# Patient Record
Sex: MALE | Race: OTHER | URBAN - METROPOLITAN AREA
[De-identification: names, ages, dates, MRNs, and addresses within clinical notes are randomized per-mention and may not be internally consistent; named-entity substitution may affect disease eponyms.]

---

## 2024-01-11 ENCOUNTER — OFFICE VISIT (OUTPATIENT)
Facility: CLINIC | Age: 69
End: 2024-01-11

## 2024-01-11 VITALS — OXYGEN SATURATION: 99 % | SYSTOLIC BLOOD PRESSURE: 128 MMHG | HEART RATE: 75 BPM | DIASTOLIC BLOOD PRESSURE: 82 MMHG

## 2024-01-11 DIAGNOSIS — R63.4 WEIGHT LOSS: ICD-10-CM

## 2024-01-11 DIAGNOSIS — E05.90 THYROTOXICOSIS WITHOUT THYROID STORM, UNSPECIFIED THYROTOXICOSIS TYPE: ICD-10-CM

## 2024-01-11 DIAGNOSIS — E11.65 TYPE 2 DIABETES MELLITUS WITH HYPERGLYCEMIA, WITHOUT LONG-TERM CURRENT USE OF INSULIN (HCC): Primary | ICD-10-CM

## 2024-01-11 PROCEDURE — 99205 OFFICE O/P NEW HI 60 MIN: CPT

## 2024-01-11 RX ORDER — ATORVASTATIN CALCIUM 40 MG/1
40 TABLET, FILM COATED ORAL DAILY
COMMUNITY
Start: 2023-07-18

## 2024-01-11 RX ORDER — CANAGLIFLOZIN 300 MG/1
300 TABLET, FILM COATED ORAL
COMMUNITY

## 2024-01-11 RX ORDER — PROPRANOLOL HYDROCHLORIDE 10 MG/1
10 TABLET ORAL 2 TIMES DAILY
COMMUNITY

## 2024-01-11 RX ORDER — ENALAPRIL MALEATE 10 MG/1
10 TABLET ORAL DAILY
COMMUNITY

## 2024-01-11 NOTE — PATIENT INSTRUCTIONS
How to apply for medicaid: [email or web address]    THYROID PLAN:  - repeat thyroid lab tests within the next week or so. Have [facility] fax to me: Fax number: [phone]  - repeat thyroid lab tests again after 2/19/24  - repeat thyroid lab tests one more time before our next visit: Around 4/1/24. I also added on an A1C(For diabetes)  to be done before that next visit  - based on the labs that I receive from you today, we will start methimazole  - continue propranolol 10mg twice daily; this will help your heart rate control related to the thyroid- goal is 60-90 AT rest . You can use a pulse oximeter to check  - side effects of methimazole include: Up to 15 percent of people who take an antithyroid drug have minor side effects. Both methimazole and PTU can cause itching, rash, hives, joint pain and swelling, fever, changes in taste, nausea, and vomiting. Please notify if you experience any of these side effects  - I am checking your thyroid antibodies for Graves disease. Review the information on Graves attached. If not Graves, we will complete a thyroid uptake and scan- I will call you with that information     Understanding thyroid labs:  - TSH = thyroid stimulating hormone, this is the 'signal' which turns UP or DOWN depending on how much hormone your thyroid gland is producing.    - free T4, total T3 = the thyroid hormones in your blood, this is the thyroid number you can \"Feel\"- if T4 is low, that indicates hypothyroidism, if T4 is high, that indicates hyperthyroidism    Diabetes:  - CHECK BLOOD SUGAR 1-2 TIMES PER WEEK. The goals are outlined below. I recommend using Relion glucometer - Test your blood sugar in the morning or before dinner 1-2x per week - pre breakfast, or pre dinner, and write down what it is - [email or web address]/[email or web address]- ReliOn Platinum Blood Glucose Monitoring System (you need a meter, strips, and a lancing device)    DIABETES  MEDICATIONS:  - call office and schedule a nutrition visit with Delphine (Office phone number: [phone])  - continue invokana 300mg daily   - Start metformin- we will start with 500mg once daily and slowly increase over four weeks to the goal of 1000mg twice daily. They are large tablets. It is best to take them with food to reduce unwanted GI side effects. We are going to start this slow and increase it, so you do not have GI side effects. The most common side effect is diarrhea, but when increased slowly usually it resolves within 2-3 days.   - Metformin dosing:   Week 1: Metformin 500m tab daily in morning with food  Week 2: Metformin 500mg  1 tabs in AM, 1 tab in PM with food  Week 3: Metformin 500m tabs in AM, 1 tab in PM   Week 4: Metformin 500m tabs in AM, 2 tabs in PM (for total dose of 1,000mg twice daily)       - Please review the following on your INVOKANA - drink plenty of water  This medication will remove extra sugar out of blood into your urine. It can be dosed anytime of day, but morning is probably best time to take it as it can cause increased urination. Please drink at least 4 glasses of water daily to avoid dehydration. It does not cause low blood sugars (hypoglycemia). If you develop any low blood sugars, we will need to adjust other medications. Please call me if you develop any symptoms of urinary tract infection (burning with urination) or yeast infection (new rash itching or burning in the genital area).     While you are taking this medication please be mindful of sick day protocol (for illness, if you are vomiting, excessively exercising/sweating, or alcohol intake) to avoid dehydration:   -Temporarily hold the medication if you are in a dehydrated state. You can call the clinic for further instruction if you are sick and can't remember which diabetes medication to hold.  -Check blood sugar levels more often while sick  -Seek medical help early if you develop any abdominal  pain, nausea or vomiting.  -Okay to resume this medication once you are eating /drinking regularly and no longer dehydrated.    Surgery protocol:  If you are having surgery please contact the clinic as we will need to adjust your diabetes medications. This medication needs to be held at least 4 days prior to your procedure.. Once eating/drinking normally after surgery, ok to resume (this avoids dehydration).            [facility] - KIM Asher  Office phone number: [phone]  Fax number: [phone]

## 2024-01-11 NOTE — PROGRESS NOTES
[facility] Note    Name: Richard Valencia    Date: 1/11/2024    HISTORY OF PRESENT ILLNESS   Richard Valencia is a 68 year old male with PMHx significant for T2DM, anxiety/depression,  who presents for  hyperthyroidism and T2DM management.    Initial HPI consult in January 2024  Here with his wife, Jose and daughter Cinda. Pt declines  services and that his daughter provides translation.    Diabetes hx:  Last A1c value was 8.7% done 12/2023  Here for management of hyperthyroidism and DM. Brought in notes from [facility] in [place]    Diagnosed w/ DM age: 2011 (age 56)  Pt denies hx of hospitalization for DM and/or pancreatitis   Family history of DM: strong on mother's side  Random  in office, nonfasting (ate coffee & 1 mexican sweet bread, 2 pc sweet potato)  DM meds at first office visit: invokana 300mg daily  Denies polydipsia/polyuria    Blood Glucose log/CGM: not checking his BG at home  Previously trialed/failed DM meds: metformin- diarrhea    Thyroid hx:  (-) Fhx of thyroid disease   Dx'd age 68 on blood work (11/2023)    11/2023- TSH <0.005, fT4 2.01, antiTPO neg  12/2023: TSH <0.005, fT4 - 2.97    Pt endorses: anxiety, recent weight loss of approx 20-25lbs. Notes legs are cold, occ diarrhea, tremor    Pt denies vision changes, recent palpitations (had some initially, but resolved w/ start of propranolol), temperature intolerance/sweating, edema      REVIEW OF SYSTEMS  Ten point review of systems has been performed and is otherwise negative and/or non-contributory, except as described above.     Medications:     Current Outpatient Medications:     atorvastatin 40 MG Oral Tab, Take 1 tablet (40 mg total) by mouth daily., Disp: , Rfl:     enalapril 10 MG Oral Tab, Take 1 tablet (10 mg total) by mouth daily., Disp: , Rfl:     propranolol 10 MG Oral Tab, Take 1 tablet (10 mg total) by mouth 2 (two) times daily., Disp: , Rfl:     Canagliflozin (INVOKANA) 300 MG Oral  Tab, Take 300 mg by mouth every morning before breakfast., Disp: , Rfl:      Allergies:   Not on File    Social History:   Social History     Socioeconomic History    Marital status:    Tobacco Use    Smoking status: Never    Smokeless tobacco: Never   Substance and Sexual Activity    Alcohol use: Not Currently     Comment: occasional    Drug use: Never       Medical History:   Reviewed    Surgical history:   History reviewed. No pertinent surgical history.    PHYSICAL EXAM  Vitals:    01/11/24 1021   BP: 128/82   Pulse: 75   SpO2: 99%       General Appearance:  alert, well developed, in no acute distress  Eyes:  normal conjunctivae, sclera  Ears/Nose/Mouth/Throat/Neck:  +diffusely enlarged goiter, mild tenderness on palpation of R sided lobe. No exophthalmus or lid lag noted  Cardiovascular:  regular rate  Respiratory:  breathing comfortably  Musculoskeletal:  normal muscle strength and tone. +tremor in hands  Skin:  normal moisture and skin texture  Hair & Nails:  normal scalp hair     Psychiatric:  oriented to time, self, and place  Neuro:  sensory grossly intact and motor grossly intact    Labs/Imaging: Reviewed.    ASSESSMENT/PLAN:    (E11.65) Type 2 diabetes mellitus with hyperglycemia, without long-term current use of insulin ([facility])  (primary encounter diagnosis)  Plan: Hemoglobin A1C [E]    Last A1c value was 8.7% done 12/2023, goal <7%. Plan to restart metformin at a low dose and trial tolerance w/ food. Meet with CDE re: carb aware diet. If BG fail to improve can trial OHARA vs TZD vs insulin; cost is a consideration as pt is self-pay. Currently getting invokana affordably thru his pharmacy.     We discussed the importance of glycemic control to prevent complications of diabetes. We discussed the importance of SBGM and consistent, low carb diet as well as moderate exercise as well.  We also discussed the complications of diabetes include retinopathy, neuropathy, nephropathy and cardiovascular disease.       - start metformin 500mg daily; incr by 500mg qWeek to 1G BID, with food  - continue invokana 300mg daily  - meet with CDE re: carb aware diet teaching  - check BG 1-2x/day using glucometer or CGM- gave relion brand info  - no strict CI for OHARA, GLP1a, or TZD; consider if not improving after metformin and diet changes  - SGLT2i instructions provided re: surgery, times of illness/dehydration       DM quality metrics:  - Ophtho: No data recorded No data recorded  - BP is controlled, on ACE-I  - LDL is at goal (was 45 on 12/2023), on statin    - MAB negative 12/2023 (results reviewed from lab testing) on SGLT2i, ACE-i  - Neuropathy/ Foot exam: No data recorded  - CAD: none     (E05.90) Thyrotoxicosis without thyroid storm, unspecified thyrotoxicosis type  (R63.4) Weight loss  Plan:   TSH entirely suppressed, fT4 2.97 in Dec 2023. Ongoing hyperTH s/s, palpitation resolved with beta blocker tx. Denies vision changes.   (-) fam hx of thyroid condition. No TSI/TRAB to eval.     Discussed at length TFT interpretation. Will repeat labs today + TSI; based on history seems c/w Graves. Plan to tx with MMI, dose pending results. Continue propranolol at current dose, HR stable. Counseled on s/s of worsening hyperTH, in that circumstance encouraged to seek ER care.     - repeat TFTs today to determine MMI dose, add on TSI  - if TSI neg, will complete uptake and scan  - thyroid non nodular on exam  - no opthalmopathy on exam      Return in about 3 months (around 4/11/2024) for DM follow up, thyroid follow up, repeat A1C at visit.    A total of 60 minutes was spent today using translation to obtain history, reviewing pertinent labs, evaluating patient, providing multiple treatment options, reinforcing diet/exercise and compliance, and completing documentation.      1/11/2024  KIM Asher

## 2024-01-12 ENCOUNTER — TELEPHONE (OUTPATIENT)
Facility: CLINIC | Age: 69
End: 2024-01-12

## 2024-01-12 NOTE — TELEPHONE ENCOUNTER
1/12/24-Patient brought with him to NP visit previous lab results and medication list for APN to review.  Reviewed by APN and sent to scan.

## 2024-01-15 ENCOUNTER — TELEPHONE (OUTPATIENT)
Facility: CLINIC | Age: 69
End: 2024-01-15

## 2024-01-15 RX ORDER — METHIMAZOLE 10 MG/1
10 TABLET ORAL DAILY
Qty: 90 TABLET | Refills: 0 | Status: SHIPPED | OUTPATIENT
Start: 2024-01-15

## 2024-01-15 NOTE — TELEPHONE ENCOUNTER
1/15/24-Received via fax from [facility] lab results from 1/11/24.  Placed in APN in basket for review.

## 2024-01-15 NOTE — TELEPHONE ENCOUNTER
Pt's daughter Cinda phoned office back re: message left on pt's phone.   Cinda stated that pt had labs done on Friday, 1/12/24, after pt's appointment with Nina. Pt had labs done at [facility]- [facility] #[phone]. Address: [facility].  [address], [place].   RN phoned [facility]; RN was given [facility]'s direct #. #[phone]    RN phoned [facility]; spoke to automated system. System could not locate pt in system. RN then transferred to Val garcia. Tis found preliminary lab results from 1/11/24. RN provided correct office fax number. Awaiting lab results.    RN to phone Cinda back to notify her if lab results were obtained and if pt will be started on medication. Cinda #[phone] (per Cinda, ok to leave v.m. at this number).

## 2024-01-15 NOTE — TELEPHONE ENCOUNTER
Note per Nina ALVAREZ: Please call Cinda and let them know I sent MMI 10mg daily to pharmacy;  he is ok to start this today. Repeat labs at [facility] in 4 weeks as isntructed- they should have them printed out at our visit as I ordered them all ahead of time for them; confirm they have orders and remind to have [facility] fax results. Then can close encounter    RN phoned patients Daughter to no answer, LVM asking for call back to discuss lab results.

## 2024-01-15 NOTE — TELEPHONE ENCOUNTER
Noted results- the only one missing is free T4. Can we get that result so I have it to trend/decide on dose?

## 2024-01-15 NOTE — TELEPHONE ENCOUNTER
RN phoned [facility] to inquire about a non-resulted Free T4, per Nina's request.    RN spoke to Karrie. Per Karrie, FT4 is still \"in process\". \"Free T4 labs get sent to [place] for processing and processing can take up to 10 days Monday-Saturday\".  [facility] to fax Free T4 level once resulted. Endo office fax number provided to Karrie.    Will route message to Nina.

## 2024-02-11 ENCOUNTER — PATIENT MESSAGE (OUTPATIENT)
Facility: CLINIC | Age: 69
End: 2024-02-11

## 2024-02-12 NOTE — TELEPHONE ENCOUNTER
From: Richard Belle  To: Yoli Rose  Sent: 2/11/2024 11:43 AM CST  Subject: Weight loss Concern       Hello,    I wanted to get in touch with Dr. Rose in regards to a weight loss question/progress after my visit on January 11th.    All medications have been taken as instructed, with no major or concerning side effects. Diet has been modified for Diabetes care maintenance.     The concern is my steady weight loss. I now weight 143lbs and get fatigued if I do a lot of strenuous work or sudden movements. I had to cut back on my exercise and have reduced it to just walking 20-30 minutes per day. The amount of weight I’ve lost has been concerning.     I have lab work to do this week for a progress check. And I have been doing the Relion checks for Diabetes twice per week as instructed- the results have been logged and are ok.     Please advise what I can do or if the continuous weight loss is something to be concerned about. While I understand weight loss is a result/symptom of Hyperthyroidism Graves Disease- my hopes is to get this fixed or checked.     Thank you in advance for your help.     Best,   Richard Valencia

## 2024-02-12 NOTE — TELEPHONE ENCOUNTER
Once he gets repeat labs done, ensure they are faxed over. He may need a dose adjustment to the MMI.  Once the hyperTH is resolved/normalizing, he should see improvement in the weight loss, this can take some time but I anticipate it should continue to get better. If he continues to have weight loss he needs to talk more with his primary care

## 2024-02-26 ENCOUNTER — TELEPHONE (OUTPATIENT)
Facility: CLINIC | Age: 69
End: 2024-02-26

## 2024-02-26 DIAGNOSIS — E11.65 TYPE 2 DIABETES MELLITUS WITH HYPERGLYCEMIA, WITHOUT LONG-TERM CURRENT USE OF INSULIN (HCC): Primary | ICD-10-CM

## 2024-02-26 DIAGNOSIS — E05.90 THYROTOXICOSIS WITHOUT THYROID STORM, UNSPECIFIED THYROTOXICOSIS TYPE: ICD-10-CM

## 2024-02-26 RX ORDER — METHIMAZOLE 10 MG/1
10 TABLET ORAL DAILY
COMMUNITY
Start: 2024-02-26

## 2024-02-26 RX ORDER — METHIMAZOLE 5 MG/1
5 TABLET ORAL DAILY
Qty: 90 TABLET | Refills: 0 | Status: SHIPPED | OUTPATIENT
Start: 2024-02-26

## 2024-02-26 NOTE — TELEPHONE ENCOUNTER
RN phoned patients daughter Audra per note from Nina ALVAREZ- audra verbalized understanding- states she does not believe father needs refill of 10 mg at this time but will reach out if in need. We can send lab work to email on file.    Email [email or web address]    Otherwise,  office is [address] [place] [address]   [phone]    Lab orders printed and emailed to email on file.    Orders placed in RN brown folder incase email does not go through.

## 2024-02-26 NOTE — TELEPHONE ENCOUNTER
Received lab results from [facility].    Lab results placed in RN in basket for review, not in EPIC.

## 2024-02-26 NOTE — TELEPHONE ENCOUNTER
2/22/24-6 wks on mmi 10mg daily-  fT4 2.02, TT3 239, TSH <0.005  No TSI data  From pt message, still very symptomatic of hyperTH    Increase MMI 10mg daily --> 15mg daily, repeat labs in 6 weeks; orders already placed. Sent script to [facility] in [place].    Can you call the pt daughter to confirm changes and confirm where they would like the lab orders faxed to?

## 2024-03-08 ENCOUNTER — MED REC SCAN ONLY (OUTPATIENT)
Facility: CLINIC | Age: 69
End: 2024-03-08

## 2024-04-09 ENCOUNTER — TELEPHONE (OUTPATIENT)
Facility: CLINIC | Age: 69
End: 2024-04-09

## 2024-04-09 NOTE — TELEPHONE ENCOUNTER
4/9/24-Received via fax from [facility] lab results collected on 4/4/24 for A1C and TSH and Free T4.  Given to APN to review.  Placed in APN in basket.

## 2024-04-11 ENCOUNTER — OFFICE VISIT (OUTPATIENT)
Facility: CLINIC | Age: 69
End: 2024-04-11

## 2024-04-11 VITALS
WEIGHT: 147 LBS | BODY MASS INDEX: 23.63 KG/M2 | HEIGHT: 66 IN | SYSTOLIC BLOOD PRESSURE: 124 MMHG | HEART RATE: 68 BPM | DIASTOLIC BLOOD PRESSURE: 78 MMHG | OXYGEN SATURATION: 96 %

## 2024-04-11 DIAGNOSIS — E11.65 TYPE 2 DIABETES MELLITUS WITH HYPERGLYCEMIA, WITHOUT LONG-TERM CURRENT USE OF INSULIN (HCC): ICD-10-CM

## 2024-04-11 DIAGNOSIS — E05.90 THYROTOXICOSIS WITHOUT THYROID STORM, UNSPECIFIED THYROTOXICOSIS TYPE: ICD-10-CM

## 2024-04-11 DIAGNOSIS — R63.4 WEIGHT LOSS: ICD-10-CM

## 2024-04-11 DIAGNOSIS — E05.00 GRAVES DISEASE: Primary | ICD-10-CM

## 2024-04-11 LAB — HEMOGLOBIN A1C: 6 % (ref 4.3–5.6)

## 2024-04-11 PROCEDURE — 83036 HEMOGLOBIN GLYCOSYLATED A1C: CPT

## 2024-04-11 PROCEDURE — 99215 OFFICE O/P EST HI 40 MIN: CPT

## 2024-04-11 RX ORDER — METFORMIN HYDROCHLORIDE EXTENDED-RELEASE TABLETS 1000 MG/1
1000 TABLET, FILM COATED, EXTENDED RELEASE ORAL
COMMUNITY

## 2024-04-11 RX ORDER — METFORMIN HYDROCHLORIDE EXTENDED-RELEASE TABLETS 1000 MG/1
1000 TABLET, FILM COATED, EXTENDED RELEASE ORAL
COMMUNITY
End: 2024-04-11

## 2024-04-11 NOTE — PROGRESS NOTES
[facility] Note    Name: Richard Belle    Date: 4/11/2024    HISTORY OF PRESENT ILLNESS   Richard Belle is a 68 year old male with PMHx significant for T2DM, anxiety/depression,  who presents for  hyperthyroidism and T2DM management.    Initial HPI consult in January 2024  Here with his wife, Jose and daughter Cinda. Pt declines  services and that his daughter provides translation.    Diabetes hx:  Last A1c value was 8.7% done 12/2023  Here for management of hyperthyroidism and DM. Brought in notes from [facility] in [place]    Diagnosed w/ DM age: 2010 (age 56)  Pt denies hx of hospitalization for DM and/or pancreatitis   Family history of DM: strong on mother's side  Random  in office, nonfasting (ate coffee & 1 mexican sweet bread, 2 pc sweet potato)  DM meds at first office visit: invokana 300mg daily  Denies polydipsia/polyuria    Blood Glucose log/CGM: not checking his BG at home  Previously trialed/failed DM meds: metformin- diarrhea    Thyroid hx:  (-) Fhx of thyroid disease   Dx'd age 68 on blood work (11/2023)    11/2023- TSH <0.005, fT4 2.01, antiTPO neg  12/2023: TSH <0.005, fT4 - 2.97    Pt endorses: anxiety, recent weight loss of approx 20-25lbs. Notes legs are cold, occ diarrhea, tremor    Pt denies vision changes, recent palpitations (had some initially, but resolved w/ start of propranolol), temperature intolerance/sweating, edema    April 2024-w/ Nina APN. 4/2024- A1C 6.0% in clinic  We incr'd metformin LOV, but he was unable to tolerate, so taking just 1 tab in the AM  Made dietary changes- used to drink regular coke, 20-24 tortillas/day, now eating lower carb  He wants to re-gain some more weight  Current DM meds at visit: metformin 1G daily,  invokana 300mg daily  Blood glucose updates: Checking BG 1x/day with glucometer.  Ranging 115-123    re: thyroid:  Feeling more relaxed, sleeping better, less diarrhea, weight is stable and  he's feeling a lot better  1/2024- TSH <0.01, fT4 (no result), total T3 387, TSI 1.41 (H)  2/2024- TSH <0.005, fT4 2.02, TT3 239; 6 wks on MMI 10mg daily --> incr'd to 15mg daily as pt messaged in still quite symptomatic (weight loss)  4/2024- TSH <0.005, fT4 1.31, no TT3 or TSI which I had requested; results on MMI 15mg daily        REVIEW OF SYSTEMS  Ten point review of systems has been performed and is otherwise negative and/or non-contributory, except as described above.     Medications:     Current Outpatient Medications:     metFORMIN HCl ER, OSM, 1000 MG (OSM) Oral Tablet 24 Hr, Take 1 tablet (1,000 mg total) by mouth daily with breakfast., Disp: , Rfl:     methIMAzole 5 MG Oral Tab, Take 1 tablet (5 mg total) by mouth daily. Take this IN ADDITION to your 10mg daily tablet, for a total of 15mg daily. Repeat labs 4-6 weeks after starting new dose., Disp: 90 tablet, Rfl: 0    methIMAzole 10 MG Oral Tab, Take 1 tablet (10 mg total) by mouth daily. PLUS one 5mg tablet, for total daily dose 15mg daily, Disp: , Rfl:     atorvastatin 40 MG Oral Tab, Take 1 tablet (40 mg total) by mouth daily., Disp: , Rfl:     enalapril 10 MG Oral Tab, Take 1 tablet (10 mg total) by mouth daily., Disp: , Rfl:     propranolol 10 MG Oral Tab, Take 1 tablet (10 mg total) by mouth 2 (two) times daily., Disp: , Rfl:     Canagliflozin (INVOKANA) 300 MG Oral Tab, Take 300 mg by mouth every morning before breakfast., Disp: , Rfl:      Allergies:   Not on File    Social History:   Social History     Socioeconomic History    Marital status:    Tobacco Use    Smoking status: Never    Smokeless tobacco: Never   Substance and Sexual Activity    Alcohol use: Not Currently     Comment: occasional    Drug use: Never       Medical History:   Reviewed    Surgical history:   History reviewed. No pertinent surgical history.    PHYSICAL EXAM  Vitals:    04/11/24 1008   BP: 124/78   Pulse: 68   SpO2: 96%   Weight: 147 lb (66.7 kg)   Height: 5' 6\"  (1.676 m)         General Appearance:  alert, well developed, in no acute distress  Eyes:  normal conjunctivae, sclera  Ears/Nose/Mouth/Throat/Neck:  +diffusely enlarged goiter, mild tenderness on palpation of R sided lobe. No exophthalmus or lid lag noted  Cardiovascular:  regular rate  Respiratory:  breathing comfortably  Musculoskeletal:  normal muscle strength and tone. +tremor in hands  Skin:  normal moisture and skin texture  Hair & Nails:  normal scalp hair     Psychiatric:  oriented to time, self, and place    Labs/Imaging: Reviewed.    DM quality metrics:   CAD: none  Neuropathy/ Foot exam: No data recorded  Ophtho: No data recorded No data recorded- due, reminded  Kidney health evaluation: --> will request to complete next time  No results found for: \"EGFRCR\", \"MICROALBCREA\"   HTN:   BP Readings from Last 1 Encounters:   04/11/24 124/78     Blood Pressure and Cardiac Medications            enalapril 10 MG Oral Tab    propranolol 10 MG Oral Tab          Lipids:  No results found for: \"CHOLEST\", \"LDL\", \"TRIG\", \"HDL\", \"FASTING\"  Cholesterol Lowering Medications            atorvastatin 40 MG Oral Tab               Wt Readings from Last 6 Encounters:   04/11/24 147 lb (66.7 kg)       Assessment & Plan:   (E11.65) Type 2 diabetes mellitus with hyperglycemia, without long-term current use of insulin (HCC)  (primary encounter diagnosis)  Plan:   Last A1c value was 8.7% done 12/2023 --> 6.0% in April 2024, goal <7%.  Tolerated metformin restart well. Currently getting invokana affordably thru his pharmacy.     We discussed the importance of glycemic control to prevent complications of diabetes. We discussed the importance of SBGM and consistent, low carb diet as well as moderate exercise as well.  We also discussed the complications of diabetes include retinopathy, neuropathy, nephropathy and cardiovascular disease.      - continue metformin 1G daily (diarrhea with higher doses)  - continue invokana 300mg daily  -  check BG 1-2x/day using glucometer or CGM- gave relion brand info  - no strict CI for OHARA, GLP1a, or TZD; consider if not improving after metformin and diet changes  - SGLT2i instructions provided re: surgery, times of illness/dehydration       (E05.90) Thyrotoxicosis without thyroid storm, unspecified thyrotoxicosis type  (R63.4) Weight loss  Plan:   Pt presented clinically and biochemically hyperTH- TSI positive.   He was started on MMI 10mg daily in Jan 2024; in Feb 2024 was still biochemically/clinically thyrotoxic so incr'd dose to 15mg daily. ~6 weeks later fT4 has downtrended nicely to 1.30. Continue MMI 15mg daily for another month. Repeat TFTs in 4 weeks.  Likely will reduce dose at that time- confirmed with family today that his 5mg tabs can be split into 2.5mg if needed.  Denies vision changes.  (-) fam hx of thyroid condition.     - repeat TFTs in 4 wks  - will hold off on uptake and scan since pt is self-pay, most likely Graves  - plan to treat with MMI for 12-18 mo; with eventual discontinuation trial  - continue propranolol 10mg daily, titrate to HR 60-90  - thyroid non nodular on exam  - no opthalmopathy on exam    Return in about 3 months (around 7/11/2024) for DM follow up, thyroid follow up.    4/11/2024  KIM Asher

## 2024-04-11 NOTE — PATIENT INSTRUCTIONS
Follow up plan:  With KIM Escalera: Return in about 3 months (around 7/11/2024) for DM follow up, thyroid follow up.  --> print his lab orders  --> Labs: free T4, TSH, and total T3, TSI  -- my fax number: [phone]    Medications:   - continue metformin 1000mg daily (1 tablet only) and invokana 300mg daily  - continue methimazole 15mg daily for now, then repeat labs after 5/15/24: make sure they are faxed to me, I will LIKELY reduce methimazole 15mg --> 12.5mg daily (you will have to cut the 5mg tablet in half), but wait on doing this until you hear from me    - check blood sugar once in the morning every other week just to keep an eye on it    Blood sugar targets:  Before breakfast: , 2 hours after meals: <180 (preferably <150), A1C goal: <7.0%  Time in Range goal is higher than 80% if using a continuous glucose monitor (Dexcom or Geovani).  Time in Range can be found within the Dexcom G7 esvin, on Clarity if using Dexcom G6, or on LibreView if using Geovani.

## 2024-04-29 RX ORDER — METHIMAZOLE 5 MG/1
5 TABLET ORAL DAILY
Qty: 90 TABLET | Refills: 0 | Status: SHIPPED | OUTPATIENT
Start: 2024-04-29

## 2024-04-29 RX ORDER — METHIMAZOLE 10 MG/1
10 TABLET ORAL DAILY
Qty: 90 TABLET | Refills: 0 | Status: SHIPPED | OUTPATIENT
Start: 2024-04-29

## 2024-04-29 NOTE — TELEPHONE ENCOUNTER
LOV: 4/11/24     RTC: 3 months- around 7/11/24    FU: scheduled 7/11/24     Last Refill: 2/26/24- 90 day supply     Month Supply Pending: 3 months       From LOV on 4/11/24, \"- continue methimazole 15mg daily for now, then repeat labs after 5/15/24: make sure they are faxed to me, I will LIKELY reduce methimazole 15mg --> 12.5mg daily (you will have to cut the 5mg tablet in half), but wait on doing this until you hear from me\"    Phoned patient and spoke with daughter, Audra- ok per SOHAIL. States patient does need refill now, he will be out of medication. Reminded to do labs mid May- states will do.

## 2024-05-22 ENCOUNTER — TELEPHONE (OUTPATIENT)
Facility: CLINIC | Age: 69
End: 2024-05-22

## 2024-05-22 DIAGNOSIS — E05.00 GRAVES DISEASE: Primary | ICD-10-CM

## 2024-05-22 NOTE — TELEPHONE ENCOUNTER
Received via fax from [facility] lab results collected on 5/18/24 for TSH, T3, and Free T4. Given to APN to review. Placed in APN in basket.

## 2024-05-22 NOTE — TELEPHONE ENCOUNTER
5/2024- TSH 0.006, fT4 1.27, TT3 158 (ref ), TSI 3.12 - taking MMI 15mg daily    Continue MMI 15mg daily for another 6 weeks, repeat labs in 6 wks  Will need orders sent to his preferred lab (ordered, just need to fax)

## 2024-05-22 NOTE — TELEPHONE ENCOUNTER
RN attempted to reach pt using a [employer] .  Chris used- ID# [id number]. Chris phoned pt; pt didn't answer. Voice mail stated phone belonged to [employer]. No message left as there was no identifying message to confirm Chris/RN were calling the correct patient (did not say the pt's name nor the number dialed).

## 2024-05-22 NOTE — TELEPHONE ENCOUNTER
RN phoned [facility] in [place] (#[phone]) to obtain [facility] fax # as RN needs to fax pt's future lab orders. RN was told to fax orders to #[phone]; orders will be scanned into pt's chart to be used next time pt comes to have thyroid labs drawn.    Lab orders faxed; awaiting fax confirmation.

## 2024-05-22 NOTE — TELEPHONE ENCOUNTER
RN phoned pt's daughter, Audra; Audra didn't answer. RN left  asking for a call back regarding pt. Per pt's SOHAIL, okay for RN to speak with Audra.

## 2024-05-23 ENCOUNTER — MED REC SCAN ONLY (OUTPATIENT)
Facility: CLINIC | Age: 69
End: 2024-05-23

## 2024-06-03 DIAGNOSIS — E05.00 GRAVES DISEASE: Primary | ICD-10-CM

## 2024-06-03 RX ORDER — METHIMAZOLE 5 MG/1
TABLET ORAL
Qty: 90 TABLET | Refills: 0 | Status: SHIPPED | OUTPATIENT
Start: 2024-06-03

## 2024-06-03 NOTE — TELEPHONE ENCOUNTER
LOV:     RTC:    FU:    Last Refill:    Month Supply Pendin day supply    Last note for dose: continue the Methimazole at 15mg daily and repeat labs again in 6 weeks.     Refill pended and routed for review.    Arm band on/IV intact

## 2024-07-12 ENCOUNTER — TELEPHONE (OUTPATIENT)
Facility: CLINIC | Age: 69
End: 2024-07-12

## 2024-07-12 NOTE — TELEPHONE ENCOUNTER
7/12/24-Received via fax from [facility] lab results collected on 7/8/24.  Placed in APN in basket.

## 2024-07-19 ENCOUNTER — MED REC SCAN ONLY (OUTPATIENT)
Facility: CLINIC | Age: 69
End: 2024-07-19

## 2024-09-03 ENCOUNTER — OFFICE VISIT (OUTPATIENT)
Facility: CLINIC | Age: 69
End: 2024-09-03

## 2024-09-03 VITALS
OXYGEN SATURATION: 96 % | BODY MASS INDEX: 26.42 KG/M2 | WEIGHT: 164.38 LBS | HEIGHT: 66 IN | DIASTOLIC BLOOD PRESSURE: 80 MMHG | HEART RATE: 78 BPM | SYSTOLIC BLOOD PRESSURE: 128 MMHG

## 2024-09-03 DIAGNOSIS — E05.00 GRAVES DISEASE: ICD-10-CM

## 2024-09-03 DIAGNOSIS — E05.90 THYROTOXICOSIS WITHOUT THYROID STORM, UNSPECIFIED THYROTOXICOSIS TYPE: ICD-10-CM

## 2024-09-03 DIAGNOSIS — E11.65 TYPE 2 DIABETES MELLITUS WITH HYPERGLYCEMIA, WITHOUT LONG-TERM CURRENT USE OF INSULIN (HCC): Primary | ICD-10-CM

## 2024-09-03 LAB — HEMOGLOBIN A1C: 6.9 % (ref 4.3–5.6)

## 2024-09-03 PROCEDURE — 83036 HEMOGLOBIN GLYCOSYLATED A1C: CPT

## 2024-09-03 PROCEDURE — 99215 OFFICE O/P EST HI 40 MIN: CPT

## 2024-09-03 RX ORDER — CANAGLIFLOZIN 300 MG/1
300 TABLET, FILM COATED ORAL
Qty: 90 TABLET | Refills: 1 | Status: SHIPPED | OUTPATIENT
Start: 2024-09-03

## 2024-09-03 NOTE — PATIENT INSTRUCTIONS
Follow up plan:  With KIM Asher: Return in about 3 months (around 12/3/2024) for thyroid follow up.    Call [facility] lab and have them fax to us: Fax number: [phone]    Updated/current diabetes medication instructions:  Diabetic Medications               Canagliflozin (INVOKANA) 300 MG Oral Tab (Taking) Take 300 mg by mouth every morning before breakfast.    metFORMIN HCl ER, OSM, 1000 MG (OSM) Oral Tablet 24 Hr (Taking) Take 1 tablet (1,000 mg total) by mouth daily with breakfast.          THYROID:  - for now, continue: Thryoid Meds: methimazole 15mg daily  - based on the labs- I will see if I can wean down to 12.5mg daily  - decrease propranolol 10mg twice daily --> cut down to once daily and check in with primary care if they still want you on this for another reason. I believe it was initially started when you were diagnosed with hyperthyroidism. From a thyroid standpoint-- you are ok to wean down on this      Plan de seguimiento:  Con KIM Asher: Regrese en aproximadamente 3 meses (alrededor del 3/12/2024) para un seguimiento de la tiroides.    Llame al laboratorio de [facility] y pídales que nos envíen un fax: Número de fax: [phone]    Instrucciones actualizadas/actuales sobre medicamentos para la diabetes:  Medicamentos para la diabetes   - Canagliflozina (INVOKANA) 300 MG, tableta oral (para radha) [place] 300 mg por vía oral todas las mañanas antes del desayuno.  - metFORMINA HCl ER, OSM, 1000 MG (OSM) Tableta oral 24 horas (gi) [place] 1 tableta (1000 mg en total) por vía oral al día con el desayuno.    --> Recomiendo consultar a un oftalmólogo e informarle que está en tratamiento para Graves y diabetes      TIROIDES:  - por ahora, continúe: Medicamentos para la tiroides: metimazol 15 mg al día  - según los laboratorios - veré si puedo reducir la dosis a 12,5 mg al día  - Disminuya el propranolol 10 mg dos veces al día -> reduzca a ivet vez al día y  consulte con atención primaria si todavía quieren que tome esto por otro motivo. Creo que empezó inicialmente cuando le diagnosticaron hipertiroidismo. Desde el punto de vista de la tiroides, está erin dejar esto    Office phone number: [phone]; phones are open Monday-Friday 8:30-4:30.   Thank you for visiting our office. We look forward to working with you to reach your health goals. As a reminder, if you need refills, please request early so there is enough time to process the request. We ask that you provide at least 5 days' notice before a refill is due, so there is time to send a request to pharmacy, process the refill, and ensure there are no other problems with obtaining the medication (backorders, prior authorization paperwork, etc). Routine refills will not be addressed on weekends, so please submit these requests during the week.    Blood sugar targets:  Before breakfast: , 2 hours after meals: <180 (preferably <150), A1C goal: <7.0%  Time in Range goal is higher than 80% if using a continuous glucose monitor (Dexcom or Geovani).  Time in Range can be found within the Dexcom G7 esvin, on Clarity if using Dexcom G6, or on LibreView if using Geovani.    HOW TO TREAT LOW BLOOD SUGAR (Hypoglycemia)  Low blood sugar= Less than 70, or if you start to have symptoms (below)  Symptoms: Shaking or trembling, fast heart rate, extreme hunger, sweating, confusion/difficulty concentrating, dizziness.    How to treat a low blood sugar if you are able to eat/drink: The Rule of 15  If you are using continuous glucose monitor that says you are low, but you do not have any symptoms, verify on fingerstick that your blood sugar is actually low before treating.   Eat 15 grams of carbs (see examples below)  Check your blood sugar after 15 minutes. If it’s still below your target range, have another serving.   Repeat these steps until it’s in your target range. Once it’s in range, if you're nervous about your sugar going low  again, have a protein source (ie, a spoonful of peanut butter).   If you have a CGM you want to look for how your arrow has changed. If you arrow is pointed up or sideways after 15 min, give your CGM more time OR check with a finger stick. Try not to eat more food until at least 15 min after the first BG check - otherwise you risk having a rebound high.  If you are experiencing symptoms and you are unable to check your blood glucose for any reason, treat the hypoglycemia.  If someone has a low blood sugar and is unconscious: Don’t hesitate to call 911. If someone is unconscious and glucagon is not available or someone does not know how to use it, call 911 immediately.    To treat a low, I recommend you carry with you easy, pre-portioned treatment for low blood sugars that are 15G of carbs:   - Children sized squeeze pouch applesauce (high fiber + carbs help prevent too high of a spike)  - Small children's sized juicebox- 15g carb --> 4oz juice box  - Glucose tablets from [facility] [employer], you can find them near diabetes supplies --> Note, you will need to eat 3-4 tablets to get to 15g of carbs  - Children sized fruit snack pack- look for one with 15 grams of total carbohydrate  - Choice of how to treat your low is important. Complex carbs, or foods that contain fats along with carbs (like chocolate) can slow the absorption of glucose and should NOT be used to treat an emergency low

## 2024-09-03 NOTE — PROGRESS NOTES
[facility] Note    Name: Richard Belle    Date: 9/3/2024    Richard Belle is a 68 year old male who presents for evaluation of T2DM management and hyperthyroid management.    Chief complaint: Diabetes (Patient presents for DM follow up. States that he has been improved since last time. States that his blood sugars have been 130 and 140. Patient works as a  for 12-14 hrs daily, reports eye swelling. Has had an eye exam last year, is due for follow up-has not had dilated eye exam.)       Subjective:   Initial HPI consult in January 2024  Here with his wife, Jose and daughter Cinda. Pt declines  services and that his daughter provides translation.     Diabetes hx:  Last A1c value was 8.7% done 12/2023  Here for management of hyperthyroidism and DM. Brought in notes from [facility] in [place]     Diagnosed w/ DM age: 2010 (age 56)  Pt denies hx of hospitalization for DM and/or pancreatitis   Family history of DM: strong on mother's side  Random  in office, nonfasting (ate coffee & 1 mexican sweet bread, 2 pc sweet potato)  DM meds at first office visit: invokana 300mg daily  Denies polydipsia/polyuria     Blood Glucose log/CGM: not checking his BG at home  Previously trialed/failed DM meds: metformin- diarrhea     Thyroid hx:  (-) Fhx of thyroid disease   Dx'd age 68 on blood work (11/2023)  11/2023- TSH <0.005, fT4 2.01, antiTPO neg  12/2023: TSH <0.005, fT4 - 2.97     Pt endorses: anxiety, recent weight loss of approx 20-25lbs. Notes legs are cold, occ diarrhea, tremor  Pt denies vision changes, recent palpitations (had some initially, but resolved w/ start of propranolol), temperature intolerance/sweating, edema     Interim hx:  Sept 2023: w/ endo KIM Wood, re: thyroid: Feeling well, is satisfied with weigh re-gain  Denies vision changes. His wife feels his eyes are more puffy around the eye.   5/2024- TSH 0.006, fT4 1.27, TT3 158 (ref  ), TSI 3.12   7/2024 labs: TSH 0.107, fT4 0.96, TT3 117, TSI 1.95 (ref 0.0-0.5) taking methimazole 15mg daily  - still taking propranolol 10mg twice daily      Re DM: Last A1c value was 6.9% done 9/3/2024. Not checking BG at all. -140  DM meds: metformin 1G daily,  invokana 300mg daily    History/Other:    Allergies, PMH, SocHx and FHx reviewed and updated as appropriate in Epic on    Canagliflozin (INVOKANA) 300 MG Oral Tab Take 300 mg by mouth every morning before breakfast. 90 tablet 1    METHIMAZOLE 5 MG Oral Tab TAKE ONE (1) TABLET (5 MG TOTAL) BY MOUTH DAILY. TAKE THIS IN ADDITION TO YOUR 10MG DAILY TABLET, FOR A TOTAL OF 15MG DAILY. REPEAT LABS 4-6 WEEKS AFTER STARTING NEW DOSE. 90 tablet 0    methIMAzole 10 MG Oral Tab Take 1 tablet (10 mg total) by mouth daily. Take with 5mg tablet, for total daily dose 15mg daily. 90 tablet 0    metFORMIN HCl ER, OSM, 1000 MG (OSM) Oral Tablet 24 Hr Take 1 tablet (1,000 mg total) by mouth daily with breakfast.      atorvastatin 40 MG Oral Tab Take 1 tablet (40 mg total) by mouth daily.      enalapril 10 MG Oral Tab Take 1 tablet (10 mg total) by mouth daily.      propranolol 10 MG Oral Tab Take 1 tablet (10 mg total) by mouth 2 (two) times daily.       Not on File  Current Outpatient Medications   Medication Sig Dispense Refill    Canagliflozin (INVOKANA) 300 MG Oral Tab Take 300 mg by mouth every morning before breakfast. 90 tablet 1    METHIMAZOLE 5 MG Oral Tab TAKE ONE (1) TABLET (5 MG TOTAL) BY MOUTH DAILY. TAKE THIS IN ADDITION TO YOUR 10MG DAILY TABLET, FOR A TOTAL OF 15MG DAILY. REPEAT LABS 4-6 WEEKS AFTER STARTING NEW DOSE. 90 tablet 0    methIMAzole 10 MG Oral Tab Take 1 tablet (10 mg total) by mouth daily. Take with 5mg tablet, for total daily dose 15mg daily. 90 tablet 0    metFORMIN HCl ER, OSM, 1000 MG (OSM) Oral Tablet 24 Hr Take 1 tablet (1,000 mg total) by mouth daily with breakfast.      atorvastatin 40 MG Oral Tab Take 1 tablet (40 mg total)  by mouth daily.      enalapril 10 MG Oral Tab Take 1 tablet (10 mg total) by mouth daily.      propranolol 10 MG Oral Tab Take 1 tablet (10 mg total) by mouth 2 (two) times daily.       Past Medical History:    Anxiety    whole life but worse with hyperTH    Depression    Diabetes (HCC)    Essential hypertension    Hyperlipidemia    Hyperthyroidism     Family History   Problem Relation Age of Onset    Heart Attack Mother     No Known Problems Father     No Known Problems Brother     Diabetes Maternal Uncle     Diabetes Maternal Uncle     Thyroid disease Daughter      Social history: Reviewed.      ROS/Exam    REVIEW OF SYSTEMS: Ten point review of systems has been performed and is otherwise negative and/or non-contributory, except as described above.     VITALS  Vitals:    09/03/24 1052   BP: 128/80   BP Location: Left arm   Patient Position: Sitting   Cuff Size: adult   Pulse: 78   SpO2: 96%   Weight: 164 lb 6.4 oz (74.6 kg)   Height: 5' 6\" (1.676 m)       Wt Readings from Last 6 Encounters:   09/03/24 164 lb 6.4 oz (74.6 kg)   04/11/24 147 lb (66.7 kg)       PHYSICAL EXAM  CONSTITUTIONAL:  awake, alert, cooperative, no apparent distress, and appears stated age   PSYCH: normal affect  LUNGS: breathing comfortably  CARDIOVASCULAR:  regular rate   NECK:  diffusely enlarged thyroid, nontender    Labs/Imaging: Pertinent imaging reviewed.    July 2024:      Overall glucose control:  Lab Results   Component Value Date    A1C 6.9 (A) 09/03/2024    A1C 6.0 (A) 04/11/2024       Supplementary Documentation:   -Surveillance for Diabetes Complications & Risks  Foot exam/neuropathy: No data recorded  Retinopathy screening: No data recordedNo data recorded    Assessment & Plan:     ICD-10-CM    1. Type 2 diabetes mellitus with hyperglycemia, without long-term current use of insulin (HCC)  E11.65 POC Hgb A1C      2. Graves disease  E05.00       3. Thyrotoxicosis without thyroid storm, unspecified thyrotoxicosis type  E05.90            Richard Belle is a pleasant 68 year old male here for evaluation of:    #Graves disease  Plan:   68 year old male, presented to endo clinic Jan 2024, clinically and biochemically hyperTH- TSI positive.     Started MMI 10mg daily in Jan 2024; in Feb 2024 was still biochemically/clinically thyrotoxic so incr'd dose to 15mg daily. TFTs incrementaly improving.   No opthalmopathy on physical exam today but patient's wife noticing periorbital edema- recommend follow up with ophthalmologist. .  (-) fam hx of thyroid condition.      4/2024- TSH <0.005, fT4 1.31- MMI 15mg daily  5/2024- TSH 0.006, fT4 1.27, TT3 158 (ref ), TSI 3.12   7/2024- TSH 0.107, fT4 0.96, TT3 117, TSI 1.95 (ref 0.0-0.5) - methimazole 15mg daily     - repeat TFTs in 8 weeks  - patient did labs recently- daughter Cinda is going to have clinic fax over orders  - will hold off on uptake and scan since pt is self-pay, at this point managing with methimazole most likely Graves  - plan to treat with MMI for 12-18 mo; with eventual discontinuation trial  - taking propranolol 10mg twice daily- HR stable- advised he can wean to 10mg daily and follow up with PCP who originally started med; can likely entirely discontinue  - thyroid non nodular on exam  - follow up with opthamology      #Diabetes- PMHx of Type 2 diabetes mellitus diagnosed age 56.  Historically well controlled on oral DM meds. BG trending higher which patient attributes to eating fast food more often. Discussed carb aware diet.  Continue current meds. Renal fxn stable.    Last A1c value was 6.9% done 9/3/2024.  Goal <7%. Importance of better glucose control in preventing onset/progression of end-organ damage discussed, as well as goals of therapy and clinical significance of A1C.     - continue metformin 1G daily (diarrhea with higher doses) - GFR 94 July 2024  - continue invokana 300mg daily  - check BG 1-2x/day using glucometer or CGM- gave relion brand info  - no strict  CI for OHARA, GLP1a, or TZD; consider if not improving after metformin and diet changes  - SGLT2i instructions provided re: surgery, times of illness/dehydration   -See above header \"Supplementary Documentation\" for surveillance for diabetes complications & risks    Nephropathy screening  - GFR 7/2024- 94  - taking ACEi    #Hypertension  - SBP is to goal <130   BP Readings from Last 1 Encounters:   09/03/24 128/80   BP Meds: enalapril Tabs - 10 MG; propranolol Tabs - 10 MG     #Hyperlipidemia   -no recent lipid panel (gets labs done at PCP office)  - continue follow up with PCP  - taking atorvastatin Tabs - 40 MG        Return in about 4 months (around 1/3/2025) for thyroid follow up.    A total of 45 minutes was spent today on obtaining history, reviewing pertinent labs, evaluating patient, providing multiple treatment options, reinforcing diet/exercise and compliance, and completing documentation.       9/3/2024  KIM Asher    Note to patient: The 21 Century Cures Act makes medical notes like these available to patients in the interest of transparency. However, be advised this is a medical document. It is intended as peer to peer communication. It is written in medical language and may contain abbreviations or verbiage that are unfamiliar. It may appear blunt or direct. Medical documents are intended to carry relevant information, facts as evident, and the clinical opinion of the practitioner.

## 2024-09-03 NOTE — PROGRESS NOTES
[facility]    Name: Richard Belle    Date: 9/3/2024    HISTORY OF PRESENT ILLNESS   Richard Belle is a 68 year old male with PMHx significant for T2DM, anxiety/depression,  who presents for  hyperthyroidism and T2DM management.    Initial HPI consult in January 2024  Here with his wife, Jose and daughter Cinda. Pt declines  services and that his daughter provides translation.    Diabetes hx:  Last A1c value was 8.7% done 12/2023  Here for management of hyperthyroidism and DM. Brought in notes from [facility] in [place]    Diagnosed w/ DM age: 2010 (age 56)  Pt denies hx of hospitalization for DM and/or pancreatitis   Family history of DM: strong on mother's side  Random  in office, nonfasting (ate coffee & 1 mexican sweet bread, 2 pc sweet potato)  DM meds at first office visit: invokana 300mg daily  Denies polydipsia/polyuria    Blood Glucose log/CGM: not checking his BG at home  Previously trialed/failed DM meds: metformin- diarrhea    Thyroid hx:  (-) Fhx of thyroid disease   Dx'd age 68 on blood work (11/2023)  11/2023- TSH <0.005, fT4 2.01, antiTPO neg  12/2023: TSH <0.005, fT4 - 2.97    Pt endorses: anxiety, recent weight loss of approx 20-25lbs. Notes legs are cold, occ diarrhea, tremor  Pt denies vision changes, recent palpitations (had some initially, but resolved w/ start of propranolol), temperature intolerance/sweating, edema    Interim hx:  Sept 2023: w/ endo IVETTEN Nina, re: thyroid: Feeling well, is satisfied with weigh re-gain  Denies vision changes. His wife feels his eyes are more puffy around the eye.   5/2024- TSH 0.006, fT4 1.27, TT3 158 (ref ), TSI 3.12   7/2024 labs: TSH 0.107, fT4 0.96, TT3 117, TSI 1.95 (ref 0.0-0.5) taking methimazole 15mg daily  - still taking propranolol 10mg twice daily     Re DM: Last A1c value was 6.9% done 9/3/2024. Not checking BG at all. -140  DM meds: metformin 1G daily,  invokana  300mg daily      REVIEW OF SYSTEMS  Ten point review of systems has been performed and is otherwise negative and/or non-contributory, except as described above.     Medications:     Current Outpatient Medications:     METHIMAZOLE 5 MG Oral Tab, TAKE ONE (1) TABLET (5 MG TOTAL) BY MOUTH DAILY. TAKE THIS IN ADDITION TO YOUR 10MG DAILY TABLET, FOR A TOTAL OF 15MG DAILY. REPEAT LABS 4-6 WEEKS AFTER STARTING NEW DOSE., Disp: 90 tablet, Rfl: 0    methIMAzole 10 MG Oral Tab, Take 1 tablet (10 mg total) by mouth daily. Take with 5mg tablet, for total daily dose 15mg daily., Disp: 90 tablet, Rfl: 0    metFORMIN HCl ER, OSM, 1000 MG (OSM) Oral Tablet 24 Hr, Take 1 tablet (1,000 mg total) by mouth daily with breakfast., Disp: , Rfl:     atorvastatin 40 MG Oral Tab, Take 1 tablet (40 mg total) by mouth daily., Disp: , Rfl:     enalapril 10 MG Oral Tab, Take 1 tablet (10 mg total) by mouth daily., Disp: , Rfl:     propranolol 10 MG Oral Tab, Take 1 tablet (10 mg total) by mouth 2 (two) times daily., Disp: , Rfl:     Canagliflozin (INVOKANA) 300 MG Oral Tab, Take 300 mg by mouth every morning before breakfast., Disp: , Rfl:      Allergies:   Not on File    Social History:   Social History     Socioeconomic History    Marital status:    Tobacco Use    Smoking status: Never    Smokeless tobacco: Never   Substance and Sexual Activity    Alcohol use: Not Currently     Comment: occasional    Drug use: Never       Medical History:   Reviewed    Surgical history:   No past surgical history on file.    PHYSICAL EXAM  Vitals:    09/03/24 1052   BP: 128/80   BP Location: Left arm   Patient Position: Sitting   Cuff Size: adult   Pulse: 78   SpO2: 96%   Weight: 164 lb 6.4 oz (74.6 kg)   Height: 5' 6\" (1.676 m)         General Appearance:  alert, well developed, in no acute distress  Eyes:  normal conjunctivae, sclera  Ears/Nose/Mouth/Throat/Neck:  +diffusely enlarged goiter, mild tenderness on palpation of R sided lobe. No  exophthalmus or lid lag noted  Cardiovascular:  regular rate  Respiratory:  breathing comfortably  Musculoskeletal:  normal muscle strength and tone. +tremor in hands  Skin:  normal moisture and skin texture  Hair & Nails:  normal scalp hair     Psychiatric:  oriented to time, self, and place    Labs/Imaging: Reviewed.    Lab Results   Component Value Date    A1C 6.9 (A) 09/03/2024    A1C 6.0 (A) 04/11/2024            Wt Readings from Last 6 Encounters:   09/03/24 164 lb 6.4 oz (74.6 kg)   04/11/24 147 lb (66.7 kg)       DM quality metrics:   CAD: none  Neuropathy/ Foot exam: No data recorded  Ophtho: No data recorded No data recorded- due, reminded    Kidney health evaluation:  - GFR 7/2024- 94  - taking ACEi      HTN:   BP Readings from Last 1 Encounters:   09/03/24 128/80     Blood Pressure and Cardiac Medications            enalapril 10 MG Oral Tab    propranolol 10 MG Oral Tab          Lipids:  No results found for: \"CHOLEST\", \"LDL\", \"TRIG\", \"HDL\", \"FASTING\"  Cholesterol Lowering Medications            atorvastatin 40 MG Oral Tab           Wt Readings from Last 6 Encounters:   09/03/24 164 lb 6.4 oz (74.6 kg)   04/11/24 147 lb (66.7 kg)       Assessment & Plan:   (E11.65) Type 2 diabetes mellitus with hyperglycemia, without long-term current use of insulin (HCC)  (primary encounter diagnosis)  Plan:   Last A1c value was 8.7% done 12/2023 --> 6.0% in April 2024, goal <7%.  Tolerated metformin restart well. Currently getting invokana affordably thru his pharmacy.     We discussed the importance of glycemic control to prevent complications of diabetes. We discussed the importance of SBGM and consistent, low carb diet as well as moderate exercise as well.  We also discussed the complications of diabetes include retinopathy, neuropathy, nephropathy and cardiovascular disease.      - continue metformin 1G daily (diarrhea with higher doses)  - continue invokana 300mg daily  - check BG 1-2x/day using glucometer or CGM-  gave relion brand info  - no strict CI for OHARA, GLP1a, or TZD; consider if not improving after metformin and diet changes  - SGLT2i instructions provided re: surgery, times of illness/dehydration       #Graves disease  Plan:   Pt presented  Jan 2024, clinically and biochemically hyperTH- TSI positive.   He was started on MMI 10mg daily in Jan 2024; in Feb 2024 was still biochemically/clinically thyrotoxic so incr'd dose to 15mg daily. ~6 weeks later fT4 has downtrended nicely to 1.30. Continue MMI 15mg daily for another month. Repeat TFTs in 4 weeks.  Likely will reduce dose at that time- confirmed with family today that his 5mg tabs can be split into 2.5mg if needed.  Denies vision changes.  (-) fam hx of thyroid condition.     4/2024- TSH <0.005, fT4 1.31- MMI 15mg daily  5/2024- TSH 0.006, fT4 1.27, TT3 158 (ref ), TSI 3.12   7/2024- TSH 0.107, fT4 0.96, TT3 117, TSI 1.95 (ref 0.0-0.5) - methimazole 15mg daily    - repeat TFTs in 4 wks  - will hold off on uptake and scan since pt is self-pay, most likely Graves  - plan to treat with MMI for 12-18 mo; with eventual discontinuation trial  - continue propranolol 10mg daily, titrate to HR 60-90***  - thyroid non nodular on exam  - no opthalmopathy on exam    No follow-ups on file.    9/3/2024  KIM Asher

## 2024-09-04 NOTE — TELEPHONE ENCOUNTER
-CT chest/abdomen and pelvis w/ contrast w/ pyelonephritis and renal abscess   -repeat CT spine with contrast +visualization of kidney read is stable, without worsening pyelo or abscess, no evidence of discitis    Plan:  -F/up cultures- +ve for staph, neg MRSA pcr, yeast now growing in blood as well  -Follow repeat cultures. As of 9/1 still fungemic, susceptibilities pending  -ID following-c/w cefazolin, fluconazole  -C/s to Optho for eval of endopthalmitis in setting of yeast in blood   -Removed PICC on 8/28  -Driveline infection vs pyelo vs PICC related  - TTE negative for endocarditis     MMI 10mg daily sent to pharmacy  His TSI was positive, consistent with Graves Disease    Staff to call daughter to inform then can close encounter

## 2024-09-23 DIAGNOSIS — E05.00 GRAVES DISEASE: ICD-10-CM

## 2024-09-23 RX ORDER — METHIMAZOLE 5 MG/1
TABLET ORAL
Qty: 90 TABLET | Refills: 0 | Status: SHIPPED | OUTPATIENT
Start: 2024-09-23

## 2024-09-23 RX ORDER — METHIMAZOLE 10 MG/1
TABLET ORAL
Qty: 90 TABLET | Refills: 0 | Status: SHIPPED | OUTPATIENT
Start: 2024-09-23

## 2024-09-23 NOTE — TELEPHONE ENCOUNTER
Endocrine refill protocol for medications for hypothyroidism and hyperthyroidism    Protocol Criteria:  FAILED Reason: No labs completed in required time frame  Appointment with Endocrinology completed in the last 12 months or scheduled in the next 6 months     Verify appointment has been completed or scheduled in the appropriate timeline. If so can send a 90 day supply with 1 refill per provider protocol.    Normal TSH result in the past 12 months   Review recent telephone encounters and mychart communications with patient to ensure a dose change has not occurred since last office visit that was not updated in the medication history list   Last completed office visit:9/3/2024 Yoli Rose APN   Next scheduled Follow up:   Future Appointments   Date Time Provider Department Center   1/6/2025 10:45 AM Yoli Rose APN [facility]      Last TSH result: No results found for: \"TSH\"    Last office note: methimazole 15mg daily     Routed for review.

## 2025-01-02 ENCOUNTER — TELEPHONE (OUTPATIENT)
Facility: CLINIC | Age: 70
End: 2025-01-02

## 2025-01-02 DIAGNOSIS — E05.00 GRAVES DISEASE: ICD-10-CM

## 2025-01-02 DIAGNOSIS — E05.90 THYROTOXICOSIS WITHOUT THYROID STORM, UNSPECIFIED THYROTOXICOSIS TYPE: ICD-10-CM

## 2025-01-02 DIAGNOSIS — E11.65 TYPE 2 DIABETES MELLITUS WITH HYPERGLYCEMIA, WITHOUT LONG-TERM CURRENT USE OF INSULIN (HCC): Primary | ICD-10-CM

## 2025-01-02 NOTE — TELEPHONE ENCOUNTER
Received call from Cinda, patients daughter- ok per [facility],  wanting to know if patient needs repeat labs done before 1/8 visit    Reviewed RN to discuss with Apn and confirm.    Patient gets labs with [facility] and unsure if these results will be back and faxed to office in time.    Next available apt for APN is 2/29/25    Cinda states Patient doing really well, very active and energetic these days. They are ok with rescheduling if need be    Patient uses [facility]- fax orders to #[phone]    Pended possible orders and routed for review.

## 2025-01-03 NOTE — TELEPHONE ENCOUNTER
Ok to reschedule to mid-Feb appointment, but please be sure that he gets labs done prior to appointment and has them faxed over to us

## 2025-01-03 NOTE — TELEPHONE ENCOUNTER
RN phoned [facility], no answer, left message stating labs to be faxed to [facility] to push apt to mid feb, labs need to be done and faxed back prior    Faxed labs, awaiting confirmation.    Cinda to call back and re schedule if needed.

## 2025-02-20 ENCOUNTER — OFFICE VISIT (OUTPATIENT)
Facility: CLINIC | Age: 70
End: 2025-02-20

## 2025-02-20 VITALS
HEART RATE: 85 BPM | SYSTOLIC BLOOD PRESSURE: 130 MMHG | BODY MASS INDEX: 26.65 KG/M2 | WEIGHT: 165.81 LBS | DIASTOLIC BLOOD PRESSURE: 82 MMHG | OXYGEN SATURATION: 98 % | HEIGHT: 66 IN

## 2025-02-20 DIAGNOSIS — E11.59 HYPERTENSION ASSOCIATED WITH TYPE 2 DIABETES MELLITUS (HCC): ICD-10-CM

## 2025-02-20 DIAGNOSIS — E11.65 TYPE 2 DIABETES MELLITUS WITH HYPERGLYCEMIA, WITHOUT LONG-TERM CURRENT USE OF INSULIN (HCC): ICD-10-CM

## 2025-02-20 DIAGNOSIS — I15.2 HYPERTENSION ASSOCIATED WITH TYPE 2 DIABETES MELLITUS (HCC): ICD-10-CM

## 2025-02-20 DIAGNOSIS — E11.69 HYPERLIPIDEMIA ASSOCIATED WITH TYPE 2 DIABETES MELLITUS (HCC): ICD-10-CM

## 2025-02-20 DIAGNOSIS — E05.00 GRAVES DISEASE: Primary | ICD-10-CM

## 2025-02-20 DIAGNOSIS — E78.5 HYPERLIPIDEMIA ASSOCIATED WITH TYPE 2 DIABETES MELLITUS (HCC): ICD-10-CM

## 2025-02-20 PROCEDURE — 99215 OFFICE O/P EST HI 40 MIN: CPT

## 2025-02-20 NOTE — PROGRESS NOTES
[facility]    Name: Richard Belle    Date: 2/20/2025    Richard Belle is a 69 year old male who presents for evaluation of T2DM management and hyperthyroid management.    Chief complaint: Follow - Up (Pt presents for DM follow up. Repeat A1C completed in clinic.  )       Subjective:   Initial HPI consult in January 2024  Here with his wife, Jose and daughter Cinda. Pt declines  services and that his daughter provides translation.     Diabetes hx:  Last A1c value was 8.7% done 12/2023  Here for management of hyperthyroidism and DM. Brought in notes from [facility] in [place]     Diagnosed w/ DM age: 2010 (age 56)  Pt denies hx of hospitalization for DM and/or pancreatitis   Family history of DM: strong on mother's side  Random  in office, nonfasting (ate coffee & 1 mexican sweet bread, 2 pc sweet potato)  DM meds at first office visit: invokana 300mg daily  Denies polydipsia/polyuria     Blood Glucose log/CGM: not checking his BG at home  Previously trialed/failed DM meds: metformin- diarrhea     Thyroid hx:  (-) Fhx of thyroid disease   Dx'd age 68 on blood work (11/2023)  11/2023- TSH <0.005, fT4 2.01, antiTPO neg  12/2023: TSH <0.005, fT4 - 2.97     Pt endorses: anxiety, recent weight loss of approx 20-25lbs. Notes legs are cold, occ diarrhea, tremor  Pt denies vision changes, recent palpitations (had some initially, but resolved w/ start of propranolol), temperature intolerance/sweating, edema     Interim hx:  2/20/2025- w/ endo KIM Wood, re: thyroid.    Thryoid Meds: methimazole 15mg daily  Still awaiting the fax of thyroid labs which were done a few weeks ago   He reduced propranolol to once daily, but then 3 wks ago with PCP incr'd back up to twice daily dosing   Denies following symptoms: Confusion, feeling cold, lethargy, constipation. He feels really good     Diabetes:  Last A1c value was 6.9% done 9/3/2024.  Checking BG 1x/week, 141,  138, 131  Had A1C done with PCP (unable to review)     -DM meds at visit:   Diabetic Medications               Canagliflozin (INVOKANA) 300 MG Oral Tab Take 300 mg by mouth every morning before breakfast.    metFORMIN HCl ER, OSM, 1000 MG (OSM) Oral Tablet 24 Hr Take 1 tablet (1,000 mg total) by mouth daily with breakfast.                 History/Other:    Allergies, PMH, SocHx and FHx reviewed and updated as appropriate in Epic on   No outpatient medications have been marked as taking for the 2/20/25 encounter (Office Visit) with Yoli Norman APN.     Not on File  Current Outpatient Medications   Medication Sig Dispense Refill    methIMAzole 10 MG Oral Tab TAKE ONE (1) TABLET (5 MG TOTAL) BY MOUTH DAILY. TAKE THIS IN ADDITION TO YOUR 10MG DAILY TABLET, FOR A TOTAL OF 15MG DAILY. 90 tablet 0    methIMAzole 5 MG Oral Tab TAKE ONE (1) TABLET (5 MG TOTAL) BY MOUTH DAILY. TAKE THIS IN ADDITION TO YOUR 10MG DAILY TABLET, FOR A TOTAL OF 15MG DAILY. 90 tablet 0    Canagliflozin (INVOKANA) 300 MG Oral Tab Take 300 mg by mouth every morning before breakfast. 90 tablet 1    metFORMIN HCl ER, OSM, 1000 MG (OSM) Oral Tablet 24 Hr Take 1 tablet (1,000 mg total) by mouth daily with breakfast.      atorvastatin 40 MG Oral Tab Take 1 tablet (40 mg total) by mouth daily.      enalapril 10 MG Oral Tab Take 1 tablet (10 mg total) by mouth daily.      propranolol 10 MG Oral Tab Take 1 tablet (10 mg total) by mouth 2 (two) times daily.       Past Medical History:    Anxiety    whole life but worse with hyperTH    Depression    Diabetes (HCC)    Essential hypertension    Hyperlipidemia    Hyperthyroidism     Family History   Problem Relation Age of Onset    Heart Attack Mother     No Known Problems Father     No Known Problems Brother     Diabetes Maternal Uncle     Diabetes Maternal Uncle     Thyroid disease Daughter      Social history: Reviewed.      ROS/Exam    REVIEW OF SYSTEMS: Ten point review of systems has been performed and  is otherwise negative and/or non-contributory, except as described above.     VITALS  Vitals:    02/20/25 1614   BP: 130/82   BP Location: Left arm   Patient Position: Sitting   Cuff Size: adult   Pulse: 85   SpO2: 98%   Weight: 165 lb 12.8 oz (75.2 kg)   Height: 5' 6\" (1.676 m)       Wt Readings from Last 6 Encounters:   02/20/25 165 lb 12.8 oz (75.2 kg)   09/03/24 164 lb 6.4 oz (74.6 kg)   04/11/24 147 lb (66.7 kg)       PHYSICAL EXAM  CONSTITUTIONAL:  awake, alert, cooperative, no apparent distress, and appears stated age   PSYCH: normal affect  LUNGS: breathing comfortably  CARDIOVASCULAR:  regular rate    MSK: no tremor noted  ENT: no exophthalmos or lid lag noted, thyroid nonnodular    Labs/Imaging: Pertinent imaging reviewed.    July 2024:      Overall glucose control:  Lab Results   Component Value Date    A1C 6.9 (A) 09/03/2024    A1C 6.0 (A) 04/11/2024     Wt Readings from Last 6 Encounters:   02/20/25 165 lb 12.8 oz (75.2 kg)   09/03/24 164 lb 6.4 oz (74.6 kg)   04/11/24 147 lb (66.7 kg)       Supplementary Documentation:   -Surveillance for Diabetes Complications & Risks  Foot exam/neuropathy: No data recorded  Retinopathy screening: No data recordedNo data recorded    Assessment & Plan:     ICD-10-CM    1. Graves disease  E05.00 TSH and Free T4 [E]     Triiodothyronine (T3) Total     TSH and Free T4 [E]     Triiodothyronine (T3) Total     TSH and Free T4 [E]     Triiodothyronine (T3) Total [E]     TSH and Free T4 [E]     Triiodothyronine (T3) Total     Thyroid Stimulating Immunoglobulin     Thyroid Stimulating Immunoglobulin     Thyrotropin Receptor Ab, Serum      2. Type 2 diabetes mellitus with hyperglycemia, without long-term current use of insulin (HCC)  E11.65 POC Hemoglobin A1C      3. Hypertension associated with type 2 diabetes mellitus (HCC)  E11.59     I15.2       4. Hyperlipidemia associated with type 2 diabetes mellitus (HCC)  E11.69     E78.5           Richard Annie Mcadamsoz is a pleasant 69  year old male here for evaluation of:    #Graves disease  Plan:   69 year old male, presented to endo clinic Jan 2024, clinically and biochemically hyperTH- TSI positive.     Started MMI 10mg daily in Jan 2024; in Feb 2024 was still biochemically/clinically thyrotoxic so incr'd dose to 15mg daily. TFTs incrementaly improving.   No opthalmopathy on physical exam.  (-) fam hx of thyroid condition.     Repeated labs a few weeks ago but we have not received them. Will await results from his clinic. He does not show s/s of hypothyroidism today. Reviewed if develops s/s of hypo or hyperthyroid to contact the office for sooner labs. Because patient is self pay we are utilizing our visits at q6mo intervals. Will plan on q6 wk labs as noted below to keep monitoring his blood work.      4/2024- TSH <0.005, fT4 1.31- MMI 15mg daily  5/2024- TSH 0.006, fT4 1.27, TT3 158 (ref ), TSI 3.12   7/2024- TSH 0.107, fT4 0.96, TT3 117, TSI 1.95 (ref 0.0-0.5) - methimazole 15mg daily  No other recent labs-- clinic is faxing them over     - methimazole 15mg daily for now; adjustments pending results  - will hold off on uptake and scan since pt is self-pay, at this point managing with methimazole most likely Graves  - plan to treat with MMI for 12-18 mo; with eventual discontinuation trial  - taking propranolol 10mg twice daily- HR stable-PCP is now managing due to BP; asked them to continue follow up with PCP re: propranolol  - thyroid non nodular on exam  - follow up with ophthalmology PRN    Complete labs (gave Cinda, daughter, printed copies)   - 4/1/25  -5/15/25 (also with TSI)  -7/1/25  -8/15/25 (also with TSI and TRAB)      #Type 2 Diabetes  PMHx of Type 2 diabetes mellitus diagnosed age 56.  Historically well controlled on oral DM meds. BG trending higher which patient attributes to eating fast food more often. Discussed carb aware diet previously.  Continue current meds. Renal fxn stable.    Eating soft diet due to dental  issues. Weight is fluctuating.  Will await A1C results from his PCP office.     Last A1c value was 6.9% done 9/3/2024.  Goal <7%. Importance of better glucose control in preventing onset/progression of end-organ damage discussed, as well as goals of therapy and clinical significance of A1C.     - continue metformin 1G daily (diarrhea with higher doses) - GFR 94 July 2024  - continue invokana 300mg daily  - check BG 1-2x/day using glucometer or CGM- gave relion brand info  - no strict CI for OHARA, GLP1a, or TZD; consider if not improving after metformin and diet changes  - SGLT2i instructions provided re: surgery, times of illness/dehydration   -See above header \"Supplementary Documentation\" for surveillance for diabetes complications & risks    Nephropathy screening  - GFR 7/2024- 94  - taking ACEi    #Hypertension  - SBP is to goal <130   BP Readings from Last 1 Encounters:   02/20/25 130/82   BP Meds: enalapril Tabs - 10 MG; propranolol Tabs - 10 MG     #Hyperlipidemia   -no recent lipid panel (gets labs done at PCP office)  - continue follow up with PCP  - taking atorvastatin Tabs - 40 MG        Return in about 6 months (around 8/20/2025). Sooner if sx's. Labs q6 wks to be faxed over- told them they should be in contact w/ us at least every 2mo for a dose check in.   A total of 45 minutes was spent today on obtaining history, reviewing pertinent labs, evaluating patient, providing multiple treatment options, reinforcing diet/exercise and compliance, and completing documentation.       2/20/2025  KIM Alanis     Note to patient: The 21 Century Cures Act makes medical notes like these available to patients in the interest of transparency. However, be advised this is a medical document. It is intended as peer to peer communication. It is written in medical language and may contain abbreviations or verbiage that are unfamiliar. It may appear blunt or direct. Medical documents are intended to carry relevant  information, facts as evident, and the clinical opinion of the practitioner.

## 2025-02-20 NOTE — PATIENT INSTRUCTIONS
Follow up with KIM Alanis: Return in about 6 months (around 8/20/2025).  Seguimiento con Yoli Norman, APN: Regreso en aproximadamente 6 meses (alrededor del 20/8/2025).    Laboratorios completos:  - 4/1/25  -15/05/25 (también con [facility])  -7/1/25  -15/8/25 (también con [facility] y [facility])    PLAN TIROIDES:  Medicación: Medicamentos para la tiroides: metimazol 15 mg al día  --> Me comunicaré con usted con el plan de medicación ivet vez que lleguen los laboratorios.   --> Y enviaré por fax el plan de laboratorio repetido a pacheco clínica.  --> La preocupación con el uso de metimazol es que estamos haciendo BAJAR la tiroides y es posible provocarle \"hipotiroidismo\", lo cual tampoco queremos. Entonces, si nota esos síntomas antes de la próxima serie de análisis de laboratorio, hágamelo saber.    Esté atento a los síntomas de hipo o hipertiroidismo. Si sientes estos síntomas, llámame antes:  Los síntomas del hipotiroidismo, o tiroides poco activa, pueden incluir fatiga, aumento de peso y sensibilidad al frío. Otros síntomas incluyen:   Piel y chana: piel y chana secos y ásperos, adelgazamiento del chana, disminución de la sudoración.  Cambios menstruales: períodos menstruales abundantes o irregulares, problemas de fertilidad.  Voz: Ronquera, habla lenta.  Mabel musculares: mabel musculares, sensibilidad y rigidez.  Millicent: Millicent hinchada, párpados caídos, hinchazón alrededor de los ojos.  Tala mental: olvido o dificultad para concentrarse  Estado de ánimo: depresión, ansiedad.  Frecuencia cardíaca: frecuencia cardíaca lenta, también llamada bradicardia.  Tiroides: Bocio, un agrandamiento de la tiroides que puede causar que pacheco pierce luzca hinchado.    Síntomas de hipertiroidismo: temblores, palpitaciones, calor excesivo, dificultad para dormir o pérdida de peso inexplicable a pesar del gran apetito.      THYROID PLAN:  Medication: Thryoid Meds: methimazole 15mg daily  --> I will contact you with plan for medication  once the labs come through   --> And I will fax repeat lab plan to your clinic    Look out for symptoms of both hypo or hyperthyroidism. If you feel these symptoms, call me sooner:  Symptoms of hypothyroidism, or an underactive thyroid, can include fatigue, weight gain, and sensitivity to cold. Other symptoms include:   Skin and hair: Dry, coarse skin and hair, thinning hair, decreased sweating  Menstrual changes: Heavy or irregular menstrual periods, fertility problems  Voice: Hoarseness, slow speech  Muscle aches: Muscle aches, tenderness, and stiffness  Face: Puffy face, drooping eyelids, puffiness around the eyes  Brain fog: Forgetfulness or difficulty concentrating  Mood: Depression, anxiety  Heart rate: Slowed heart rate, also called bradycardia  Thyroid: Goiter, an enlarged thyroid that may cause your neck to look swollen    Hyperthyroid symptoms: Tremors, palpitations, excessive warmth, trouble sleeping, or unexplained weight loss despite large appetite       Imaging/testing:   [x] No further imaging/testing needed   [] Uptake and scan ordered   [] Thyroid ultrasound ordered  [] FNA biopsy    Updated diabetes medication instructions from today:   Diabetic Medications               Canagliflozin (INVOKANA) 300 MG Oral Tab Take 300 mg by mouth every morning before breakfast.    metFORMIN HCl ER, OSM, 1000 MG (OSM) Oral Tablet 24 Hr Take 1 tablet (1,000 mg total) by mouth daily with breakfast.            Lab Results   Component Value Date    A1C 6.9 (A) 09/03/2024    A1C 6.0 (A) 04/11/2024        General follow up information:  Please let us know if you require any refills at least 1 week prior to your medication running out. If you do run out of medication, please call our office ASAP to request refills (do not wait until your follow up).   Please call our office if sugars at home are consistently greater than 250 or less than 70 for medication adjustment (do not wait until your follow up appointment).  Lab  results and imaging will typically be reviewed at follow up appointments, or within 3-5 business days of ALL results being in if you do not have an appointment scheduled in the near future. Our office will contact you for any abnormal results requiring more urgent follow up or action.   The on-call pager is for urgent matters only. If you are a type 1 diabetic and run out of insulin after business hours 8AM-4PM, you may call the on-call pager for a refill to a 24 hour pharmacy.  If you have adrenal insufficiency and run out of steroids, you may call the on-call pager for a refill to a 24 hour pharmacy. All other refill requests should be requested during business hours.    Office phone number: [phone]; phones are open Monday-Friday 8:30-4:30.       HOW TO TREAT LOW BLOOD SUGAR (Hypoglycemia)  Low blood sugar= Less than 70, or if you start to have symptoms (below)  Symptoms: Shaking or trembling, fast heart rate, extreme hunger, sweating, confusion/difficulty concentrating, dizziness.    How to treat a low blood sugar if you are able to eat/drink: The Rule of 15/15  If you are using continuous glucose monitor that says you are low, but you do not have any symptoms, verify on fingerstick that your blood sugar is actually low before treating.   Eat 15 grams of carbs (see examples below)  Check your blood sugar after 15 minutes. If it’s still below your target range, have another serving.   Repeat these steps until it’s in your target range. Once it’s in range, if you're nervous about your sugar going low again, have a protein source (ie, a spoonful of peanut butter).   If you have a CGM you want to look for how your arrow has changed. If you arrow is pointed up or sideways after 15 min, give your CGM more time OR check with a finger stick. Try not to eat more food until at least 15 min after the first BG check - otherwise you risk having a rebound high.  If you are experiencing symptoms and you are unable to check  your blood glucose for any reason, treat the hypoglycemia.  If someone has a low blood sugar and is unconscious: Don’t hesitate to call 911. If someone is unconscious and glucagon is not available or someone does not know how to use it, call 911 immediately.

## 2025-03-28 ENCOUNTER — MED REC SCAN ONLY (OUTPATIENT)
Facility: CLINIC | Age: 70
End: 2025-03-28

## 2025-05-22 ENCOUNTER — TELEPHONE (OUTPATIENT)
Facility: CLINIC | Age: 70
End: 2025-05-22

## 2025-05-22 DIAGNOSIS — E05.00 GRAVES DISEASE: ICD-10-CM

## 2025-05-22 PROBLEM — E05.90 HYPERTHYROIDISM: Status: ACTIVE | Noted: 2024-01-04

## 2025-05-22 RX ORDER — METHIMAZOLE 5 MG/1
TABLET ORAL
COMMUNITY
Start: 2025-05-22

## 2025-05-22 RX ORDER — METHIMAZOLE 10 MG/1
TABLET ORAL
COMMUNITY
Start: 2025-05-22

## 2025-05-22 NOTE — TELEPHONE ENCOUNTER
5/22/25-Received via fax from [facility] thyroid labs collected on 5/7/25.  Placed in APN in basket for review.

## 2025-05-22 NOTE — TELEPHONE ENCOUNTER
Labs reviewed. Please call patient daughter Cinda w/ plan:   Reduce dose from 15mg daily --> 12.5mg daily (take 1 10mg tab, 1/2 of the 5mg tab)    Repeat labs in 8 weeks as planned on-7/1/25 (gave them print outs when they were in clinic, they live far)

## 2025-05-30 ENCOUNTER — MED REC SCAN ONLY (OUTPATIENT)
Facility: CLINIC | Age: 70
End: 2025-05-30